# Patient Record
Sex: MALE | Race: WHITE | NOT HISPANIC OR LATINO | Employment: FULL TIME | URBAN - METROPOLITAN AREA
[De-identification: names, ages, dates, MRNs, and addresses within clinical notes are randomized per-mention and may not be internally consistent; named-entity substitution may affect disease eponyms.]

---

## 2017-09-11 ENCOUNTER — OFFICE VISIT (OUTPATIENT)
Dept: INTERNAL MEDICINE | Facility: CLINIC | Age: 57
End: 2017-09-11

## 2017-09-11 VITALS
WEIGHT: 234 LBS | SYSTOLIC BLOOD PRESSURE: 124 MMHG | BODY MASS INDEX: 33.5 KG/M2 | DIASTOLIC BLOOD PRESSURE: 64 MMHG | HEIGHT: 70 IN

## 2017-09-11 DIAGNOSIS — G47.09 OTHER INSOMNIA: ICD-10-CM

## 2017-09-11 DIAGNOSIS — Z86.718 HISTORY OF DVT (DEEP VEIN THROMBOSIS): ICD-10-CM

## 2017-09-11 DIAGNOSIS — Z82.49 FAMILY HISTORY OF HEART DISEASE: ICD-10-CM

## 2017-09-11 DIAGNOSIS — Z00.00 ANNUAL PHYSICAL EXAM: Primary | ICD-10-CM

## 2017-09-11 DIAGNOSIS — G24.9 DYSTONIA: ICD-10-CM

## 2017-09-11 DIAGNOSIS — Z79.01 CHRONIC ANTICOAGULATION: ICD-10-CM

## 2017-09-11 DIAGNOSIS — Z86.711 HISTORY OF PULMONARY EMBOLISM: ICD-10-CM

## 2017-09-11 LAB
ALBUMIN SERPL-MCNC: 4.4 G/DL (ref 3.5–5.2)
ALBUMIN/GLOB SERPL: 1.2 G/DL
ALP SERPL-CCNC: 55 U/L (ref 39–117)
ALT SERPL W P-5'-P-CCNC: 27 U/L (ref 1–41)
ANION GAP SERPL CALCULATED.3IONS-SCNC: 13.1 MMOL/L
AST SERPL-CCNC: 25 U/L (ref 1–40)
BASOPHILS # BLD AUTO: 0.02 10*3/MM3 (ref 0–0.2)
BASOPHILS NFR BLD AUTO: 0.3 % (ref 0–2)
BILIRUB SERPL-MCNC: 0.5 MG/DL (ref 0.1–1.2)
BILIRUB UR QL STRIP: NEGATIVE
BUN BLD-MCNC: 21 MG/DL (ref 6–20)
BUN/CREAT SERPL: 16 (ref 7–25)
CALCIUM SPEC-SCNC: 10 MG/DL (ref 8.6–10.5)
CHLORIDE SERPL-SCNC: 96 MMOL/L (ref 98–107)
CHOLEST SERPL-MCNC: 172 MG/DL (ref 0–200)
CLARITY UR: CLEAR
CO2 SERPL-SCNC: 26.9 MMOL/L (ref 22–29)
COLOR UR: YELLOW
CREAT BLD-MCNC: 1.31 MG/DL (ref 0.76–1.27)
DEPRECATED RDW RBC AUTO: 41.2 FL (ref 37–54)
EOSINOPHIL # BLD AUTO: 0.05 10*3/MM3 (ref 0–0.7)
EOSINOPHIL NFR BLD AUTO: 0.8 % (ref 0–5)
ERYTHROCYTE [DISTWIDTH] IN BLOOD BY AUTOMATED COUNT: 12.6 % (ref 11.5–15)
GFR SERPL CREATININE-BSD FRML MDRD: 56 ML/MIN/1.73
GLOBULIN UR ELPH-MCNC: 3.6 GM/DL
GLUCOSE BLD-MCNC: 100 MG/DL (ref 65–99)
GLUCOSE UR STRIP-MCNC: NEGATIVE MG/DL
HCT VFR BLD AUTO: 52.1 % (ref 40.1–51)
HDLC SERPL-MCNC: 46 MG/DL (ref 40–60)
HGB BLD-MCNC: 17.9 G/DL (ref 13.7–17.5)
HGB UR QL STRIP.AUTO: NEGATIVE
INR PPP: 2.8
KETONES UR QL STRIP: NEGATIVE
LDLC SERPL CALC-MCNC: 104 MG/DL (ref 0–100)
LDLC/HDLC SERPL: 2.26 {RATIO}
LEUKOCYTE ESTERASE UR QL STRIP.AUTO: NEGATIVE
LYMPHOCYTES # BLD AUTO: 1.43 10*3/MM3 (ref 0.8–7)
LYMPHOCYTES NFR BLD AUTO: 21.5 % (ref 10–60)
MCH RBC QN AUTO: 30.7 PG (ref 26–34)
MCHC RBC AUTO-ENTMCNC: 34.4 G/DL (ref 31–37)
MCV RBC AUTO: 89.4 FL (ref 80–100)
MONOCYTES # BLD AUTO: 0.65 10*3/MM3 (ref 0–1)
MONOCYTES NFR BLD AUTO: 9.8 % (ref 0–13)
NEUTROPHILS # BLD AUTO: 4.5 10*3/MM3 (ref 1–11)
NEUTROPHILS NFR BLD AUTO: 67.6 % (ref 30–85)
NITRITE UR QL STRIP: NEGATIVE
PH UR STRIP.AUTO: 7 [PH] (ref 5–8)
PLATELET # BLD AUTO: 192 10*3/MM3 (ref 150–450)
PMV BLD AUTO: 10.3 FL (ref 6–12)
POTASSIUM BLD-SCNC: 3.8 MMOL/L (ref 3.5–5.2)
PROT SERPL-MCNC: 8 G/DL (ref 6–8.5)
PROT UR QL STRIP: NEGATIVE
RBC # BLD AUTO: 5.83 10*6/MM3 (ref 4.63–6.08)
SODIUM BLD-SCNC: 136 MMOL/L (ref 136–145)
SP GR UR STRIP: 1.01 (ref 1–1.03)
TRIGL SERPL-MCNC: 111 MG/DL (ref 0–150)
TSH SERPL DL<=0.05 MIU/L-ACNC: 1.85 MIU/ML (ref 0.27–4.2)
UROBILINOGEN UR QL STRIP: NORMAL
VLDLC SERPL-MCNC: 22.2 MG/DL (ref 5–40)
WBC NRBC COR # BLD: 6.65 10*3/MM3 (ref 5–10)

## 2017-09-11 PROCEDURE — 85610 PROTHROMBIN TIME: CPT | Performed by: INTERNAL MEDICINE

## 2017-09-11 PROCEDURE — 80053 COMPREHEN METABOLIC PANEL: CPT | Performed by: INTERNAL MEDICINE

## 2017-09-11 PROCEDURE — 84443 ASSAY THYROID STIM HORMONE: CPT | Performed by: INTERNAL MEDICINE

## 2017-09-11 PROCEDURE — 36416 COLLJ CAPILLARY BLOOD SPEC: CPT | Performed by: INTERNAL MEDICINE

## 2017-09-11 PROCEDURE — 85025 COMPLETE CBC W/AUTO DIFF WBC: CPT | Performed by: INTERNAL MEDICINE

## 2017-09-11 PROCEDURE — 36415 COLL VENOUS BLD VENIPUNCTURE: CPT | Performed by: INTERNAL MEDICINE

## 2017-09-11 PROCEDURE — 81003 URINALYSIS AUTO W/O SCOPE: CPT | Performed by: INTERNAL MEDICINE

## 2017-09-11 PROCEDURE — 99386 PREV VISIT NEW AGE 40-64: CPT | Performed by: INTERNAL MEDICINE

## 2017-09-11 PROCEDURE — 80061 LIPID PANEL: CPT | Performed by: INTERNAL MEDICINE

## 2017-09-11 RX ORDER — FOLIC ACID 1 MG/1
1 TABLET ORAL
COMMUNITY
Start: 2017-08-18 | End: 2017-11-16

## 2017-09-11 RX ORDER — LEVOCETIRIZINE DIHYDROCHLORIDE 5 MG/1
5 TABLET, FILM COATED ORAL
COMMUNITY

## 2017-09-11 RX ORDER — NAPROXEN SODIUM 220 MG
220 TABLET ORAL
COMMUNITY
End: 2020-08-17

## 2017-09-11 RX ORDER — HYDROCHLOROTHIAZIDE 25 MG/1
25 TABLET ORAL
COMMUNITY

## 2017-09-11 RX ORDER — OMEGA-3-ACID ETHYL ESTERS 1 G/1
2 CAPSULE, LIQUID FILLED ORAL
COMMUNITY
Start: 2017-08-18

## 2017-09-11 RX ORDER — WARFARIN SODIUM 5 MG/1
7.5 TABLET ORAL
COMMUNITY
Start: 2017-08-18 | End: 2017-11-16

## 2017-09-11 RX ORDER — BUTALBITAL, ASPIRIN, AND CAFFEINE 50; 325; 40 MG/1; MG/1; MG/1
1 CAPSULE ORAL
COMMUNITY

## 2017-09-11 RX ORDER — TESTOSTERONE GEL, 1% 10 MG/G
50 GEL TRANSDERMAL
COMMUNITY

## 2017-09-11 RX ORDER — TIZANIDINE 4 MG/1
4 TABLET ORAL
COMMUNITY
Start: 2017-08-18 | End: 2017-11-16

## 2017-09-11 RX ORDER — TRAZODONE HYDROCHLORIDE 150 MG/1
150 TABLET ORAL
COMMUNITY
Start: 2017-07-17 | End: 2017-10-15

## 2017-09-11 NOTE — PROGRESS NOTES
Chief Complaint   Patient presents with   • new patient     new patient get est   • Rash     rash on both legs, has had blood clots in past and concerned        Subjective   Kris Michelle is a 57 y.o. male     HPI:  He is a new patient here to establish care, have general exam.  He has several medical problems.    History of DVT and pulmonary embolus: This occurred after playing travel in 2009.  He had an extensive hematology evaluation.  He states he was evaluated for hypercoagulable disorders and was also evaluated for occult malignancy.  Evaluation was negative.  After treatment of DVT and pulmonary embolus, discontinuation of warfarin was tried.  However, he had recurrent DVT and has been on warfarin ever since.  He states that his pro times have been good.  Usually in the mid 2 range.  He has recently had a rash on both lower legs.  He thought it might.  Later to recurrent DVT.  He has had a venous Doppler which was negative.  There is been no change in clothing, specifically socks.  He does not soak his feet in any way.  He does not have rash on other parts of his body.  The rash does not itch.  There has been no change in personal care products.    History of muscle spasms, dystonia.  He has history of muscles in his arms and legs going into sustained spasm.  He was evaluated by neurology and diagnosed with dystonia.  This is been treated with a muscle relaxer.  His symptoms are controlled with this.    He has problems with insomnia.  He takes trazodone for this.    There is a family history of heart disease.  He apparently also has dyslipidemia.  He takes Lovaza for this.    He has history of kidney stones.  He had at least one episode of a kidney stone.  The stone was evaluated by urology.  He has been taking hydrochlorothiazide to prevent recurrent stones.  He also has low testosterone and follows up with urology for that.    He has history of narrow angle glaucoma.  He has regular ophthalmology  "examinations.        Rash on both legs, since beginning of the year.  Went to see dermatologist. Had ul done to look for clots, it thee all the [place], slightly raised. Does not itch,.  Believes he tried a steroid cream.      The following portions of the patient's history were reviewed and updated as appropriate: allergies, current medications, past social history, problem list, past surgical history    Review of Systems   Constitutional: Negative for appetite change, chills, fatigue and fever.   HENT: Negative for congestion, ear pain, sinus pressure, sneezing, sore throat, tinnitus, trouble swallowing and voice change.    Eyes: Negative for visual disturbance.        He wears glasses.  He has history of glaucoma.   Respiratory: Negative for cough and shortness of breath.    Cardiovascular: Positive for leg swelling. Negative for chest pain and palpitations.        He had significant swelling of his leg when he had the DVT.   Gastrointestinal: Negative for abdominal pain, constipation, diarrhea, nausea and vomiting.   Endocrine: Negative for cold intolerance, heat intolerance, polydipsia and polyuria.   Genitourinary: Negative for dysuria and frequency.   Musculoskeletal: Positive for myalgias (with episodes of dystonia). Negative for arthralgias, back pain and gait problem.   Skin: Positive for rash.   Neurological: Negative for dizziness, syncope and headaches.   Hematological: Negative for adenopathy. Does not bruise/bleed easily.   Psychiatric/Behavioral: Negative for decreased concentration, dysphoric mood and sleep disturbance. The patient is not nervous/anxious.            Objective     /64  Ht 70\" (177.8 cm)  Wt 234 lb (106 kg)  BMI 33.58 kg/m2     Physical Exam   Constitutional: He is oriented to person, place, and time. He appears well-developed and well-nourished. No distress.   HENT:   Right Ear: Hearing, tympanic membrane, external ear and ear canal normal.   Left Ear: Hearing, tympanic " membrane, external ear and ear canal normal.   Nose: Right sinus exhibits no maxillary sinus tenderness and no frontal sinus tenderness. Left sinus exhibits no maxillary sinus tenderness and no frontal sinus tenderness.   Eyes: Conjunctivae, EOM and lids are normal. Pupils are equal, round, and reactive to light.   Neck: Trachea normal and phonation normal. Neck supple. Normal carotid pulses and no JVD present. Carotid bruit is not present. No thyroid mass and no thyromegaly present.   Cardiovascular: Normal rate, regular rhythm, S1 normal and S2 normal.  PMI is not displaced.  Exam reveals no gallop and no friction rub.    No murmur heard.  Pulses:       Carotid pulses are 2+ on the right side, and 2+ on the left side.       Radial pulses are 2+ on the right side, and 2+ on the left side.        Dorsalis pedis pulses are 2+ on the right side, and 2+ on the left side.   Pulmonary/Chest: Effort normal and breath sounds normal. He has no wheezes. He has no rhonchi. He has no rales. Chest wall is not dull to percussion.   Abdominal: Soft. Normal appearance, normal aorta and bowel sounds are normal. He exhibits no abdominal bruit and no mass. There is no hepatosplenomegaly. There is no tenderness.   Musculoskeletal: Normal range of motion. He exhibits no edema or tenderness.   Lymphadenopathy:     He has no cervical adenopathy.     He has no axillary adenopathy.        Left: No supraclavicular adenopathy present.   Neurological: He is alert and oriented to person, place, and time. He has normal strength and normal reflexes. No cranial nerve deficit or sensory deficit. Coordination normal.   Skin: Skin is warm and dry. No rash noted.   Psychiatric: He has a normal mood and affect. His speech is normal and behavior is normal. Judgment and thought content normal. Cognition and memory are normal. He is attentive.   Nursing note and vitals reviewed.      Assessment/Plan   Problem List Items Addressed This Visit     None       Visit Diagnoses     Annual physical exam    -  Primary    Relevant Orders    Comprehensive Metabolic Panel    CBC & Differential    Urinalysis With / Microscopic If Indicated    Lipid Panel    TSH    Chronic anticoagulation        Relevant Orders    POC INR    History of DVT (deep vein thrombosis)        History of pulmonary embolism        Dystonia        Relevant Medications    tiZANidine (ZANAFLEX) 4 MG tablet    Family history of heart disease        Other insomnia            Encouraged him to continue prudent diet, regular exercise.  He had a colonoscopy this year in July.  He had a Tdap in 2014.  He has regular dental and eye exams.  We discussed the rash.  Recommended she see a dermatologist.

## 2023-04-10 ENCOUNTER — OFFICE VISIT (OUTPATIENT)
Dept: SURGERY | Facility: CLINIC | Age: 63
End: 2023-04-10
Payer: COMMERCIAL

## 2023-04-10 VITALS — BODY MASS INDEX: 34.65 KG/M2 | WEIGHT: 242 LBS | HEIGHT: 70 IN

## 2023-04-10 DIAGNOSIS — K21.9 GASTROESOPHAGEAL REFLUX DISEASE WITHOUT ESOPHAGITIS: Primary | ICD-10-CM

## 2023-04-10 DIAGNOSIS — Z80.0 FAMILY HISTORY OF COLON CANCER: ICD-10-CM

## 2023-04-10 DIAGNOSIS — Z12.11 SCREENING FOR MALIGNANT NEOPLASM OF COLON: ICD-10-CM

## 2023-04-10 PROCEDURE — 99203 OFFICE O/P NEW LOW 30 MIN: CPT

## 2023-04-10 RX ORDER — CHLORAL HYDRATE 500 MG
2 CAPSULE ORAL 2 TIMES DAILY
COMMUNITY
End: 2023-04-10 | Stop reason: ALTCHOICE

## 2023-04-10 RX ORDER — MULTIPLE VITAMINS W/ MINERALS TAB 9MG-400MCG
1 TAB ORAL DAILY
COMMUNITY

## 2023-04-10 RX ORDER — TIZANIDINE 4 MG/1
4 TABLET ORAL AS NEEDED
COMMUNITY
Start: 2023-03-20

## 2023-04-10 RX ORDER — OMEPRAZOLE 20 MG/1
20 CAPSULE, DELAYED RELEASE ORAL DAILY
Qty: 30 CAPSULE | Refills: 0 | Status: SHIPPED | OUTPATIENT
Start: 2023-04-10

## 2023-04-10 RX ORDER — FAMOTIDINE 20 MG/1
20 TABLET, FILM COATED ORAL DAILY
COMMUNITY

## 2023-04-10 RX ORDER — TADALAFIL 5 MG/1
5 TABLET ORAL AS NEEDED
COMMUNITY
Start: 2023-02-09

## 2023-04-10 NOTE — PROGRESS NOTES
ASSESSMENT/PLAN:  62-year-old male in need of a screening colonoscopy. He is due this year; his last colonoscopy was in 2018 at [facility].  He has a history of colon polyps.  Family history significant for colon cancer in his paternal great uncle.  Recommend he undergo a colonoscopy.    He recently had a CTA in December 2022 due to an episode of pleuritic chest pain; past medical history significant for PE/DVT.  Imaging report noted a small hiatal hernia. Dr. Reyez and I reviewed the images as well. On my review of images, there is no hiatal hernia appreciated.  He does complain of severe nocturnal regurgitation. Recommend he undergo an EGD for further evaluation.  I have also sent a prescription for Prilosec 20 mg once daily to his pharmacy. Instructed him to begin taking this.      Also, he complains of a possible external hemorrhoid. On external exam, there are no hemorrhoids noted, he does have redundant tissue which is likely what he is feeling.    Overall, Dr. Reyez and I recommended he undergo a colonoscopy and EGD. Discussed the nature of the procedure, benefits and risks. He verbalized understanding and wishes to proceed.  He will need to hold his Warfarin 5 days prior to the procedure.    CC:     Hiatal hernia, hemorrhoid    HPI:  62 year old male who presents today for evaluation of recently noted hiatal hernia. He states for the past month he has been waking up in the middle of the night gasping for air. During this time, he has severe reflux and vomits. He has taken Pepcid and has not seen any improvement in his symptoms at nighttime. He denies any heartburn or reflux throughout the day, this is predominantly at nighttime. Denies any trouble swallowing. Denies any abdominal pain, nausea, fevers or chills. Reports normal bowel movements. He does have a history of sleep apnea, states he is CPAP and BiPAP intolerant. He also complains of a possible external hemorrhoid and rectal pain. Denies any  "rectal bleeding. He also is due for a colonoscopy this year; his last one was in 2018.  He has a history of polyps. Family history is positive for colon cancer in his paternal great uncle.     ENDOSCOPY:   • Colonoscopy 2018 - [facility], 5 year surveillance    RADIOLOGY:   • 12/15/2022 CTA Chest: Small hiatal hernia, no sign of pulmonary embolism, no significant acute pulmonary abnormality (Upon my review of images, I do not appreciate a hiatal hernia)    LABS:    • 12/15/2022 creatinine 1.80, GFR 38    SOCIAL HISTORY:   • Denies tobacco use  • Occasional alcohol use    FAMILY HISTORY:    • Colorectal cancer: Paternal great uncle  • Pancreatic cancer: Maternal grandmother    PREVIOUS ABDOMINAL SURGERY:  • Ventral hernia repair with mesh - Dr. Montanez    OTHER SURGERY:  • Hand surgery  • Wart removal - throat    PAST MEDICAL HISTORY:    • GERD  • Cataract  • Presbyopia  • Meibomian gland dysfunction  • Open angle glaucoma  • Sleep apnea  • Dyslipidemia  • Erectile dysfunction  • Degenerative disc disease  • Anxiety  • History of PE  • History of DVT  • Nephrolithiasis  • Hypogonadism     ALLERGIES:   • None    MEDICATIONS:   • Famotidine  • Folic acid  • Hydrochlorothiazide  • Levocetirizine  • Multivitamin  • Omega-3  • Tadalafil  • Testosterone  • Tizanidine  • Travatan ophthalmic solution  • Trazodone  • Warfarin  • Omeprazole    ROS:    No cough, chest pain, shortness of air.  All other systems reviewed and negative other than presenting complaints.    PHYSICAL EXAM:   • Constitutional: Well-developed, well-nourished, no acute distress  • Vital signs:   o Ht: 177.8cm (70\")  o Wt: 110kg (242lb)  o BMI: 34.72  • Eyes: Conjunctiva normal, sclera nonicteric  • ENMT: Hearing grossly normal, oral mucosa moist  • Respiratory: Clear to auscultation, normal inspiratory effort  • Cardiovascular: Regular rate, no murmur, no peripheral edema, no jugular venous distention  • Gastrointestinal: Soft, nontender, no palpable mass, " no hepatosplenomegaly, on external rectal exam, no hemorrhoid noted, there is redundant tissue  • Skin: Warm, dry, no rash on visualized skin surfaces  • Musculoskeletal: Symmetric strength, normal gait  • Psychiatric: Alert and oriented ×3, normal affect     Jessi Porter PA-C  General Surgery     [facility]  [address], [address]  [place] [address]  P: [phone]  F: [phone]

## 2023-04-11 PROBLEM — Z12.11 SCREENING FOR MALIGNANT NEOPLASM OF COLON: Status: ACTIVE | Noted: 2023-04-11

## 2023-04-11 PROBLEM — K21.9 GASTROESOPHAGEAL REFLUX DISEASE WITHOUT ESOPHAGITIS: Status: ACTIVE | Noted: 2023-04-11

## 2023-04-11 PROBLEM — Z80.0 FAMILY HISTORY OF COLON CANCER: Status: ACTIVE | Noted: 2023-04-11

## 2023-04-12 ENCOUNTER — TELEPHONE (OUTPATIENT)
Dept: SURGERY | Facility: CLINIC | Age: 63
End: 2023-04-12
Payer: COMMERCIAL

## 2023-04-12 NOTE — TELEPHONE ENCOUNTER
----- Message from Jessi Porter PA-C sent at 4/11/2023 10:06 PM EDT -----  Regarding: EGD/cscope  Hey,    Can you make sure he is aware he will need to hold his Warfarin 5 days prior to his procedure. I believe we discussed this but did not write it down when he was scheduled. I am going to send a note to cardiology/INR clinic.     Thanks,  Jessi

## 2023-04-14 ENCOUNTER — PATIENT ROUNDING (BHMG ONLY) (OUTPATIENT)
Dept: SURGERY | Facility: CLINIC | Age: 63
End: 2023-04-14
Payer: COMMERCIAL

## 2023-09-06 ENCOUNTER — HOSPITAL ENCOUNTER (OUTPATIENT)
Facility: HOSPITAL | Age: 63
Setting detail: HOSPITAL OUTPATIENT SURGERY
Discharge: HOME | End: 2023-09-06
Attending: SURGERY | Admitting: SURGERY
Payer: COMMERCIAL

## 2023-09-06 ENCOUNTER — ANESTHESIA (OUTPATIENT)
Dept: GASTROENTEROLOGY | Facility: HOSPITAL | Age: 63
End: 2023-09-06
Payer: COMMERCIAL

## 2023-09-06 ENCOUNTER — ANESTHESIA EVENT (OUTPATIENT)
Dept: GASTROENTEROLOGY | Facility: HOSPITAL | Age: 63
End: 2023-09-06
Payer: COMMERCIAL

## 2023-09-06 VITALS
HEART RATE: 76 BPM | HEIGHT: 70 IN | WEIGHT: 237.1 LBS | BODY MASS INDEX: 33.94 KG/M2 | SYSTOLIC BLOOD PRESSURE: 117 MMHG | RESPIRATION RATE: 16 BRPM | OXYGEN SATURATION: 96 % | DIASTOLIC BLOOD PRESSURE: 77 MMHG

## 2023-09-06 PROCEDURE — 45378 DIAGNOSTIC COLONOSCOPY: CPT | Performed by: SURGERY

## 2023-09-06 PROCEDURE — 25010000002 PROPOFOL 10 MG/ML EMULSION: Performed by: ANESTHESIOLOGY

## 2023-09-06 PROCEDURE — 43235 EGD DIAGNOSTIC BRUSH WASH: CPT | Performed by: SURGERY

## 2023-09-06 RX ORDER — SODIUM CHLORIDE, SODIUM LACTATE, POTASSIUM CHLORIDE, CALCIUM CHLORIDE 600; 310; 30; 20 MG/100ML; MG/100ML; MG/100ML; MG/100ML
30 INJECTION, SOLUTION INTRAVENOUS CONTINUOUS PRN
Status: DISCONTINUED | OUTPATIENT
Start: 2023-09-06 | End: 2023-09-06 | Stop reason: HOSPADM

## 2023-09-06 RX ORDER — PROPOFOL 10 MG/ML
VIAL (ML) INTRAVENOUS CONTINUOUS PRN
Status: DISCONTINUED | OUTPATIENT
Start: 2023-09-06 | End: 2023-09-06 | Stop reason: SURG

## 2023-09-06 RX ORDER — PROPOFOL 10 MG/ML
VIAL (ML) INTRAVENOUS AS NEEDED
Status: DISCONTINUED | OUTPATIENT
Start: 2023-09-06 | End: 2023-09-06 | Stop reason: SURG

## 2023-09-06 RX ORDER — SODIUM CHLORIDE, SODIUM LACTATE, POTASSIUM CHLORIDE, CALCIUM CHLORIDE 600; 310; 30; 20 MG/100ML; MG/100ML; MG/100ML; MG/100ML
INJECTION, SOLUTION INTRAVENOUS CONTINUOUS PRN
Status: DISCONTINUED | OUTPATIENT
Start: 2023-09-06 | End: 2023-09-06 | Stop reason: SURG

## 2023-09-06 RX ORDER — LIDOCAINE HYDROCHLORIDE 20 MG/ML
INJECTION, SOLUTION INFILTRATION; PERINEURAL AS NEEDED
Status: DISCONTINUED | OUTPATIENT
Start: 2023-09-06 | End: 2023-09-06 | Stop reason: SURG

## 2023-09-06 RX ADMIN — SODIUM CHLORIDE, POTASSIUM CHLORIDE, SODIUM LACTATE AND CALCIUM CHLORIDE: 600; 310; 30; 20 INJECTION, SOLUTION INTRAVENOUS at 08:46

## 2023-09-06 RX ADMIN — PROPOFOL 100 MG: 10 INJECTION, EMULSION INTRAVENOUS at 08:46

## 2023-09-06 RX ADMIN — LIDOCAINE HYDROCHLORIDE 60 MG: 20 INJECTION, SOLUTION INFILTRATION; PERINEURAL at 08:46

## 2023-09-06 RX ADMIN — Medication 160 MCG/KG/MIN: at 08:46

## 2023-09-06 RX ADMIN — SODIUM CHLORIDE, POTASSIUM CHLORIDE, SODIUM LACTATE AND CALCIUM CHLORIDE 30 ML/HR: 600; 310; 30; 20 INJECTION, SOLUTION INTRAVENOUS at 07:56

## 2023-09-06 NOTE — ANESTHESIA PREPROCEDURE EVALUATION
Anesthesia Evaluation                  Airway   Dental      Pulmonary    (+) ,sleep apnea  Cardiovascular     (+) DVT      Neuro/Psych  (+) psychiatric history  GI/Hepatic/Renal/Endo    (+) hiatal hernia, GERD, renal disease    Musculoskeletal     Abdominal    Substance History      OB/GYN          Other   arthritis,                     Anesthesia Plan    ASA 2     MAC           CODE STATUS:

## 2023-09-06 NOTE — ANESTHESIA POSTPROCEDURE EVALUATION
"Patient: Kris Michelle    Procedure Summary       Date: 09/06/23 Room / [facility]:  [facility]    Anesthesia Start: 0841 Anesthesia Stop: 0902    Procedures:       ESOPHAGOGASTRODUODENOSCOPY (Esophagus)      COLONOSCOPY to CECUM AND TI Diagnosis:       Gastroesophageal reflux disease without esophagitis      Screening for malignant neoplasm of colon      Family history of colon cancer      (Gastroesophageal reflux disease without esophagitis [K21.9])      (Screening for malignant neoplasm of colon [Z12.11])      (Family history of colon cancer [Z80.0])    Surgeons: Salinas Reyez MD Provider: Quincy Macdonald MD    Anesthesia Type: MAC ASA Status: 2            Anesthesia Type: MAC    Vitals  Vitals Value Taken Time   BP     Temp     Pulse 78 09/06/23 0902   Resp     SpO2 97 % 09/06/23 0902   Vitals shown include unvalidated device data.        Post Anesthesia Care and Evaluation    Patient location during evaluation: bedside  Patient participation: complete - patient participated  Level of consciousness: awake and alert  Pain management: adequate    Airway patency: patent  Anesthetic complications: No anesthetic complications    Cardiovascular status: acceptable  Respiratory status: acceptable  Hydration status: acceptable    Comments: /79 (BP Location: Left arm, Patient Position: Lying)   Pulse 76   Resp 16   Ht 177.8 cm (70\")   Wt 108 kg (237 lb 1.6 oz)   SpO2 97%   BMI 34.02 kg/m²     "

## 2023-09-06 NOTE — OP NOTE
PREOPERATIVE DIAGNOSIS:  GERD  High rescreening secondary to personal history of polyps and family history of colon cancer    POSTOPERATIVE DIAGNOSIS AND FINDINGS:  Small sliding hiatal hernia  Normal colon    PROCEDURE:  EGD  Colonoscopy to terminal ileum    SURGEON:  Salinas Reyez MD    ANESTHESIA:  MAC    SPECIMEN(S):  none    DESCRIPTION:  In decubitus position endoscope was inserted into the esophagus, stomach and duodenum. Antegrade and retroflex view of stomach performed.  First and second portions of duodenum were grossly normal.  Gastric antrum, body, and retroflexed view of fundus were normal.  GE junction and esophagus were normal with exception of a small sliding hiatal hernia.    Digital rectal exam was normal. Colonoscope inserted under direct visualization of lumen to cecum confirmed by visualization of ileocecal valve and appendiceal orifice.  Ileocecal valve was intubated and terminal ileum was grossly normal.  Scope was slowly withdrawn circumferentially examining all mucosal surfaces.  Bowel preparation was good.  There were no mucosal abnormalities.  Tolerated well.    RECOMMENDATION FOR FUTURE SURVEILLANCE:  5 years    Salinas Reyez M.D.

## 2023-09-06 NOTE — H&P
CC: GERD and screening    HPI: [id number] gentleman in need for surveillance colonoscopy.  He does have a family history of colon cancer in his paternal great uncle as well as a personal history of polyps.  He also reports severe nocturnal regurgitation and was advised to undergo EGD as well.  We gave her a prescription for Prilosec he indicates today that this has substantially helped with his symptoms.    PMH, PSH, MEDS AND ALLERGIES reviewed and reconciled in  EPIC    PHYSICAL EXAM:  Constitutional:  awake, alert, no acute distress  VS: afebrile, VSS  Respiratory:  normal inspiratory effort  Cardiovascular: regular rate  Gastrointestinal: Soft    ROS:  relevant systems negative other than any presenting complaints    ASSESSMENT:    GERD  Family history of colon cancer-paternal uncle  Personal history of polyps    PLAN:  EGD and colonoscopy    Salinas Reyez M.D.

## 2023-10-13 ENCOUNTER — OFFICE VISIT (OUTPATIENT)
Dept: CARDIOLOGY | Facility: CLINIC | Age: 63
End: 2023-10-13
Payer: COMMERCIAL

## 2023-10-13 VITALS
BODY MASS INDEX: 34.24 KG/M2 | HEIGHT: 70 IN | DIASTOLIC BLOOD PRESSURE: 80 MMHG | WEIGHT: 239.2 LBS | SYSTOLIC BLOOD PRESSURE: 138 MMHG | HEART RATE: 73 BPM

## 2023-10-13 DIAGNOSIS — I82.5Z9 CHRONIC DEEP VEIN THROMBOSIS (DVT) OF DISTAL VEIN OF LOWER EXTREMITY, UNSPECIFIED LATERALITY: ICD-10-CM

## 2023-10-13 DIAGNOSIS — I26.99 PULMONARY EMBOLISM, OTHER, UNSPECIFIED CHRONICITY, UNSPECIFIED WHETHER ACUTE COR PULMONALE PRESENT: ICD-10-CM

## 2023-10-13 DIAGNOSIS — I25.10 CORONARY ARTERY DISEASE INVOLVING NATIVE CORONARY ARTERY OF NATIVE HEART WITHOUT ANGINA PECTORIS: Primary | ICD-10-CM

## 2023-10-13 RX ORDER — MONTELUKAST SODIUM 10 MG/1
10 TABLET ORAL DAILY
COMMUNITY
Start: 2023-08-02

## 2023-10-13 RX ORDER — AZELASTINE 1 MG/ML
1 SPRAY, METERED NASAL
COMMUNITY
Start: 2023-08-02

## 2023-10-13 RX ORDER — ROSUVASTATIN CALCIUM 10 MG/1
10 TABLET, COATED ORAL DAILY
COMMUNITY
Start: 2023-07-27 | End: 2024-07-26

## 2023-10-13 RX ORDER — IPRATROPIUM BROMIDE AND ALBUTEROL SULFATE 2.5; .5 MG/3ML; MG/3ML
3 SOLUTION RESPIRATORY (INHALATION)
COMMUNITY
Start: 2023-09-28

## 2023-10-13 NOTE — PROGRESS NOTES
Date of Office Visit: 10/13/23  Encounter Provider: Quincy Salazar MD  Place of Service: [facility]  Patient Name: Kris Michelle  :1960  [id number]    Chief Complaint   Patient presents with    referral from PCP      CT scan showed Atherosclerosis        HPI: Kris Michelle is a 63 y.o. male  He comes to see me as a new patient.  He was at [facility] with pneumonia this summer and had a CT of his chest and it showed some calcification of his coronaries.  He is coming to see us to decide what to do about that.  He has never had cardiac problems there is a family history of it he does not have hypertension he is always says his blood pressures been good he is on HCTZ because he has had kidney stones and we are treating him for that he is on Lovaza because his wife used to sell it and she thought he should be on it not really because he has high triglycerides over and his lipids have been okay he has never been diagnosed with diabetes but he had a hemoglobin A1c of 6.3.  He exercises regularly and rigorously without limitation no chest pain no shortness of breath no palpitations no syncope.    He had a history of DVT and PE after a flight to [place] in  came off of warfarin and he developed recurrent DVT so has been on warfarin since its been pretty easily managed but then he had to go to generic and has had a lot of difficulty managing his INR since then    Past Medical History:   Diagnosis Date    Age-related cataract of both eyes     Anatomical narrow angle, bilateral     Anxiety 01/15/2016    Bilateral kidney stones     Chronic anticoagulation 2018    Colon polyp 2015    Disc disease, degenerative, lumbar or lumbosacral 2018    DVT (deep venous thrombosis) 2014    Dyslipidemia 2018    Dystonia 2020    Hiatal hernia     History of pulmonary embolus (PE) 2014    Hx of blood clots     Hx of chronic fatigue syndrome     Hx of  gastroesophageal reflux (GERD)     Hypermetropia, bilateral     Hypogonadism in male     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    COLONOSCOPY N/A 2023    Procedure: COLONOSCOPY to CECUM AND TI;  Surgeon: Salinas Reyez MD;  Location:  [facility];  Service: General;  Laterality: N/A;  NORMAL    ENDOSCOPY N/A 2023    Procedure: ESOPHAGOGASTRODUODENOSCOPY;  Surgeon: Salinas Reyez MD;  Location:  [facility];  Service: General;  Laterality: N/A;  SLIDING HIATAL HERNIA    HAND SURGERY      VENTRAL HERNIA REPAIR N/A 2006    w/ mesh-Dr. Kris CHENG REMOVAL      tongue       Social History     Socioeconomic History    Marital status:    Tobacco Use    Smoking status: Never    Smokeless tobacco: Never   Vaping Use    Vaping Use: Never used   Substance and Sexual Activity    Alcohol use: Yes     Comment: occasional    Drug use: No    Sexual activity: Yes     Comment:        Family History   Problem Relation Age of Onset    Liver disease Mother     Crohn's disease Mother     Prostate cancer Father     Glaucoma Father     Pancreatic cancer Maternal Grandmother     Lung cancer Maternal Grandfather     Heart disease Maternal Grandfather     Heart disease Paternal Grandmother     Heart disease Paternal Grandfather     Prostate cancer Paternal Grandfather     Colon cancer Other        Review of Systems   Constitutional: Negative for decreased appetite, fever, malaise/fatigue and weight loss.   HENT:  Negative for nosebleeds.     Eyes:  Negative for double vision.   Cardiovascular:  Negative for chest pain, claudication, cyanosis, dyspnea on exertion, irregular heartbeat, leg swelling, near-syncope, orthopnea, palpitations, paroxysmal nocturnal dyspnea and syncope.   Respiratory:  Negative for cough, hemoptysis and shortness of breath.    Hematologic/Lymphatic: Negative for bleeding problem.   Skin:  Negative for rash.   Musculoskeletal:  Negative for falls and myalgias.   Gastrointestinal:  Negative for hematochezia, jaundice, melena, nausea and vomiting.   Genitourinary:  Negative for hematuria.   Neurological:  Negative for dizziness and seizures.   Psychiatric/Behavioral:  Negative for altered mental status and memory loss.        No Known Allergies      Current Outpatient Medications:     azelastine (ASTELIN) 0.1 % nasal spray, 1 spray into the nostril(s) as directed by provider., Disp: , Rfl:     famotidine (PEPCID) 20 MG tablet, Take 1 tablet by mouth Daily., Disp: , Rfl:     folic acid (FOLVITE) 1 MG tablet, Take 1 tablet by mouth Daily., Disp: , Rfl:     hydrochlorothiazide (HYDRODIURIL) 25 MG tablet, Take 1 tablet by mouth Daily., Disp: , Rfl:     ipratropium-albuterol (DUO-NEB) 0.5-2.5 mg/3 ml nebulizer, Inhale 3 mL., Disp: , Rfl:     levocetirizine (XYZAL) 5 MG tablet, Take 1 tablet by mouth Every Evening., Disp: , Rfl:     montelukast (SINGULAIR) 10 MG tablet, Take 1 tablet by mouth Daily., Disp: , Rfl:     multivitamin with minerals tablet tablet, Take 1 tablet by mouth Daily., Disp: , Rfl:     omeprazole (priLOSEC) 20 MG capsule, Take 1 capsule by mouth Daily., Disp: 30 capsule, Rfl: 0    rosuvastatin (CRESTOR) 10 MG tablet, Take 1 tablet by mouth Daily., Disp: , Rfl:     tadalafil (CIALIS) 5 MG tablet, Take 1 tablet by mouth As Needed., Disp: , Rfl:     testosterone (ANDROGEL) 50 MG/5GM (1%) gel gel, Place 50 mg on the skin as directed by provider., Disp: , Rfl:     tiZANidine (ZANAFLEX) 4 MG tablet, Take 1 tablet by mouth  "As Needed., Disp: , Rfl:     travoprost, benzalkonium, (TRAVATAN) 0.004 % ophthalmic solution, Administer 1 drop to both eyes Every Night., Disp: , Rfl:     traZODone (DESYREL) 150 MG tablet, Take 1 tablet by mouth Every Night., Disp: , Rfl:       Objective:     Vitals:    10/13/23 1542   BP: 138/80   Pulse: 73   Weight: 109 kg (239 lb 3.2 oz)   Height: 177.8 cm (70\")     Body mass index is 34.32 kg/mý.    Constitutional:       Appearance: Well-developed.   Eyes:      General: No scleral icterus.  HENT:      Head: Normocephalic.   Neck:      Thyroid: No thyromegaly.      Vascular: No JVD.      Lymphadenopathy: No cervical adenopathy.   Pulmonary:      Effort: Pulmonary effort is normal.      Breath sounds: Normal breath sounds. No wheezing. No rales.   Cardiovascular:      Normal rate. Regular rhythm.      No gallop.    Edema:     Peripheral edema absent.   Abdominal:      Palpations: Abdomen is soft.      Tenderness: There is no abdominal tenderness.   Musculoskeletal: Normal range of motion. Skin:     General: Skin is warm and dry.      Findings: No rash.   Neurological:      Mental Status: Alert and oriented to person, place, and time.           ECG 12 Lead    Date/Time: 10/13/2023 4:38 PM  Performed by: Quincy Salazar MD    Authorized by: Quincy Salazar MD  Comparison: compared with previous ECG   Similar to previous ECG  Rhythm: sinus rhythm    Clinical impression: normal ECG           Assessment:       Diagnosis Plan   1. Coronary artery disease involving native coronary artery of native heart without angina pectoris        2. Chronic deep vein thrombosis (DVT) of distal vein of lower extremity, unspecified laterality        3. Pulmonary embolism, other, unspecified chronicity, unspecified whether acute cor pulmonale present               Plan:       Well I think he is asymptomatic functioning at a high level so I do not think he has any obstructive coronary disease I would like to get a copy of the CT to " look and see how much calcium you are dealing with and I will see if I can get that from [facility].  You know it is all about risk modification I love the statin for him I would probably try and drive his LDL down around 70 I we can get there with 10 mg of rosuvastatin, I dont know.  I have encouraged him to try and get his weight down ad delmy. to see that A1c come under 6.  I have also told him I do not see a role for Lovaza he does not really meet the criteria plus is a week blood thinning effect with it so would like to stop that we talked about his warfarin and with him having difficulty with it is not really happy about the hassle of taking it I think we have to switch him to low-dose Eliquis that he will stay on chronically at 2.5 twice a day there is probably an arterial benefit from that and were not can to take an aspirin because of that I will have him come back and see me in a year sooner if he has trouble    Return in about 1 year (around 10/13/2024).     As always, it has been a pleasure to participate in your patient's care.      Sincerely,       Quincy Salazar MD

## 2025-03-24 ENCOUNTER — TELEPHONE (OUTPATIENT)
Dept: CARDIOLOGY | Age: 65
End: 2025-03-24

## 2025-03-24 NOTE — TELEPHONE ENCOUNTER
Caller: Kris Michelle    Relationship: Self    Best call back number: [phone]      [facility] PCP CALLED TO SCHEDULE PATIENT    SOB, FATIGUE, TACHYCARDIA  FOR AN APPT ASAP

## 2025-03-26 ENCOUNTER — OFFICE VISIT (OUTPATIENT)
Dept: CARDIOLOGY | Age: 65
End: 2025-03-26
Payer: COMMERCIAL

## 2025-03-26 ENCOUNTER — HOSPITAL ENCOUNTER (OUTPATIENT)
Dept: CARDIOLOGY | Facility: HOSPITAL | Age: 65
Discharge: HOME | End: 2025-03-26
Admitting: NURSE PRACTITIONER
Payer: COMMERCIAL

## 2025-03-26 VITALS
HEART RATE: 91 BPM | DIASTOLIC BLOOD PRESSURE: 74 MMHG | HEIGHT: 70 IN | BODY MASS INDEX: 35.65 KG/M2 | WEIGHT: 249 LBS | SYSTOLIC BLOOD PRESSURE: 126 MMHG

## 2025-03-26 DIAGNOSIS — I25.10 CORONARY ARTERY DISEASE INVOLVING NATIVE CORONARY ARTERY OF NATIVE HEART WITHOUT ANGINA PECTORIS: ICD-10-CM

## 2025-03-26 DIAGNOSIS — E78.5 DYSLIPIDEMIA: ICD-10-CM

## 2025-03-26 DIAGNOSIS — I10 ESSENTIAL HYPERTENSION: ICD-10-CM

## 2025-03-26 DIAGNOSIS — I25.10 CORONARY ARTERY DISEASE INVOLVING NATIVE CORONARY ARTERY OF NATIVE HEART WITHOUT ANGINA PECTORIS: Primary | ICD-10-CM

## 2025-03-26 DIAGNOSIS — R53.83 OTHER FATIGUE: ICD-10-CM

## 2025-03-26 DIAGNOSIS — I82.5Z9 CHRONIC DEEP VEIN THROMBOSIS (DVT) OF DISTAL VEIN OF LOWER EXTREMITY, UNSPECIFIED LATERALITY: ICD-10-CM

## 2025-03-26 DIAGNOSIS — Z86.711 HISTORY OF PULMONARY EMBOLUS (PE): ICD-10-CM

## 2025-03-26 LAB
ALBUMIN SERPL-MCNC: 4.2 G/DL (ref 3.5–5.2)
ALBUMIN/GLOB SERPL: 1.4 G/DL
ALP SERPL-CCNC: 67 U/L (ref 39–117)
ALT SERPL W P-5'-P-CCNC: 30 U/L (ref 1–41)
ANION GAP SERPL CALCULATED.3IONS-SCNC: 10 MMOL/L (ref 5–15)
AST SERPL-CCNC: 29 U/L (ref 1–40)
BH CV STRESS BP STAGE 1: NORMAL
BH CV STRESS BP STAGE 2: NORMAL
BH CV STRESS BP STAGE 3: NORMAL
BH CV STRESS DURATION MIN STAGE 1: 3
BH CV STRESS DURATION MIN STAGE 2: 3
BH CV STRESS DURATION MIN STAGE 3: 3
BH CV STRESS DURATION SEC STAGE 1: 0
BH CV STRESS DURATION SEC STAGE 2: 0
BH CV STRESS DURATION SEC STAGE 3: 0
BH CV STRESS GRADE STAGE 1: 10
BH CV STRESS GRADE STAGE 2: 12
BH CV STRESS GRADE STAGE 3: 14
BH CV STRESS HR STAGE 1: 121
BH CV STRESS HR STAGE 2: 141
BH CV STRESS HR STAGE 3: 156
BH CV STRESS METS STAGE 1: 5
BH CV STRESS METS STAGE 2: 7.5
BH CV STRESS METS STAGE 3: 10
BH CV STRESS PROTOCOL 1: NORMAL
BH CV STRESS RECOVERY BP: NORMAL MMHG
BH CV STRESS RECOVERY HR: 101 BPM
BH CV STRESS SPEED STAGE 1: 1.7
BH CV STRESS SPEED STAGE 2: 2.5
BH CV STRESS SPEED STAGE 3: 3.4
BH CV STRESS STAGE 1: 1
BH CV STRESS STAGE 2: 2
BH CV STRESS STAGE 3: 3
BILIRUB SERPL-MCNC: 0.5 MG/DL (ref 0–1.2)
BUN SERPL-MCNC: 18 MG/DL (ref 8–23)
BUN/CREAT SERPL: 13.7 (ref 7–25)
CALCIUM SPEC-SCNC: 9.7 MG/DL (ref 8.6–10.5)
CHLORIDE SERPL-SCNC: 101 MMOL/L (ref 98–107)
CO2 SERPL-SCNC: 26 MMOL/L (ref 22–29)
CREAT SERPL-MCNC: 1.31 MG/DL (ref 0.76–1.27)
DEPRECATED RDW RBC AUTO: 40.6 FL (ref 37–54)
EGFRCR SERPLBLD CKD-EPI 2021: 60.8 ML/MIN/1.73
ERYTHROCYTE [DISTWIDTH] IN BLOOD BY AUTOMATED COUNT: 15.4 % (ref 12.3–15.4)
GLOBULIN UR ELPH-MCNC: 3.1 GM/DL
GLUCOSE SERPL-MCNC: 104 MG/DL (ref 65–99)
HCT VFR BLD AUTO: 43.9 % (ref 37.5–51)
HGB BLD-MCNC: 13.5 G/DL (ref 13–17.7)
MAXIMAL PREDICTED HEART RATE: 156 BPM
MCH RBC QN AUTO: 23 PG (ref 26.6–33)
MCHC RBC AUTO-ENTMCNC: 30.8 G/DL (ref 31.5–35.7)
MCV RBC AUTO: 74.7 FL (ref 79–97)
PERCENT MAX PREDICTED HR: 100 %
PLATELET # BLD AUTO: 276 10*3/MM3 (ref 140–450)
PMV BLD AUTO: 10.2 FL (ref 6–12)
POTASSIUM SERPL-SCNC: 4 MMOL/L (ref 3.5–5.2)
PROT SERPL-MCNC: 7.3 G/DL (ref 6–8.5)
RBC # BLD AUTO: 5.88 10*6/MM3 (ref 4.14–5.8)
SODIUM SERPL-SCNC: 137 MMOL/L (ref 136–145)
STRESS BASELINE BP: NORMAL MMHG
STRESS BASELINE HR: 86 BPM
STRESS PERCENT HR: 118 %
STRESS POST ESTIMATED WORKLOAD: 10 METS
STRESS POST EXERCISE DUR MIN: 9 MIN
STRESS POST EXERCISE DUR SEC: 0 SEC
STRESS POST PEAK BP: NORMAL MMHG
STRESS POST PEAK HR: 156 BPM
STRESS TARGET HR: 133 BPM
TSH SERPL DL<=0.05 MIU/L-ACNC: 1.51 UIU/ML (ref 0.27–4.2)
WBC NRBC COR # BLD AUTO: 8.59 10*3/MM3 (ref 3.4–10.8)

## 2025-03-26 PROCEDURE — 85027 COMPLETE CBC AUTOMATED: CPT | Performed by: NURSE PRACTITIONER

## 2025-03-26 PROCEDURE — 93017 CV STRESS TEST TRACING ONLY: CPT

## 2025-03-26 PROCEDURE — 84443 ASSAY THYROID STIM HORMONE: CPT | Performed by: NURSE PRACTITIONER

## 2025-03-26 PROCEDURE — 93018 CV STRESS TEST I&R ONLY: CPT | Performed by: INTERNAL MEDICINE

## 2025-03-26 PROCEDURE — 36415 COLL VENOUS BLD VENIPUNCTURE: CPT

## 2025-03-26 PROCEDURE — 80053 COMPREHEN METABOLIC PANEL: CPT | Performed by: NURSE PRACTITIONER

## 2025-03-26 PROCEDURE — 93000 ELECTROCARDIOGRAM COMPLETE: CPT | Performed by: NURSE PRACTITIONER

## 2025-03-26 PROCEDURE — 93016 CV STRESS TEST SUPVJ ONLY: CPT | Performed by: INTERNAL MEDICINE

## 2025-03-26 RX ORDER — ALPRAZOLAM 0.5 MG
.25-.5 TABLET ORAL NIGHTLY PRN
COMMUNITY
Start: 2025-03-18 | End: 2025-03-28

## 2025-03-26 RX ORDER — LOSARTAN POTASSIUM 25 MG/1
25 TABLET ORAL DAILY
Qty: 30 TABLET | Refills: 2 | Status: SHIPPED | OUTPATIENT
Start: 2025-03-26

## 2025-03-26 NOTE — PROGRESS NOTES
Date of Office Visit: 2025  Encounter Provider: ELLE Alvarado  Place of Service: [facility]  Patient Name: Kris Michelle  :1960    Chief Complaint   Patient presents with    Coronary Artery Disease   :     HPI: Kris Michelle is a 64 y.o. male patient who was first seen in the office by Dr. Salazar in 2023 following a CT of his chest demonstrating coronary calcification.  He was asymptomatic, and Dr. Salazar recommended focusing on risk factor modification.  He also has a history of DVT and PE for which he was previously on warfarin.  Dr. Salazar recommended switching him to low-dose Eliquis.  As such, the aspirin was discontinued.  He was advised to follow-up in 1 year.    Essentially since , he has been profoundly fatigued.  Activities like showering or even difficult for him.  Although he does exercise pretty regularly without much limitation.  He is also been struggling with anxiety and insomnia.  One night last week he had what he suspects is a panic attack.  His heart rate was mildly elevated and he felt mildly short of breath.  He denies any chest pain, palpitations, edema, dizziness, or syncope.    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     and CY, poor responder to SSRI, SNRI, TCAs, opioids    Low testosterone     Male erectile  dysfunction, unspecified 2018    Meibomian gland dysfunction (MGD) of both eyes 2021    Myalgia 04/14/2018    Nephrolithiasis 09/09/2016    Obstructive sleep apnea 2018    Presbyopia 2021    Primary open-angle glaucoma, bilateral, mild stage 2021       Past Surgical History:   Procedure Laterality Date    COLONOSCOPY N/A 2018    [facility]    COLONOSCOPY N/A 9/6/2023    Procedure: COLONOSCOPY to CECUM AND TI;  Surgeon: Salinas Reyez MD;  Location:  [facility];  Service: General;  Laterality: N/A;  NORMAL    ENDOSCOPY N/A 9/6/2023    Procedure: ESOPHAGOGASTRODUODENOSCOPY;  Surgeon: Salinas Reyez MD;  Location:  [facility];  Service: General;  Laterality: N/A;  SLIDING HIATAL HERNIA    HAND SURGERY      VENTRAL HERNIA REPAIR N/A 11/21/2006    w/ alexei-Dr. Kirs CHENG REMOVAL      tongue       Social History     Socioeconomic History    Marital status:    Tobacco Use    Smoking status: Never     Passive exposure: Never    Smokeless tobacco: Never   Vaping Use    Vaping status: Never Used   Substance and Sexual Activity    Alcohol use: Yes     Comment: occasional    Drug use: No    Sexual activity: Yes     Comment:        Family History   Problem Relation Age of Onset    Liver disease Mother     Crohn's disease Mother     Prostate cancer Father     Glaucoma Father     Pancreatic cancer Maternal Grandmother     Lung cancer Maternal Grandfather     Heart disease Maternal Grandfather     Heart disease Paternal Grandmother     Heart disease Paternal Grandfather     Prostate cancer Paternal Grandfather     Colon cancer Other        Review of Systems   Constitutional: Positive for malaise/fatigue.   Cardiovascular:  Negative for chest pain, dyspnea on exertion, leg swelling, orthopnea, palpitations, paroxysmal nocturnal dyspnea and syncope.   Respiratory: Negative.     Hematologic/Lymphatic: Negative for bleeding problem.   Musculoskeletal:  Negative for falls.   Gastrointestinal:   "Negative for melena.   Neurological:  Negative for dizziness and light-headedness.   Psychiatric/Behavioral:  The patient is nervous/anxious.        No Known Allergies      Current Outpatient Medications:     ALPRAZolam (XANAX) 0.5 MG tablet, Take 0.5-1 tablets by mouth At Night As Needed for Anxiety., Disp: , Rfl:     apixaban (ELIQUIS) 2.5 MG tablet tablet, Take 1 tablet by mouth 2 (Two) Times a Day., Disp: 180 tablet, Rfl: 3    azelastine (ASTELIN) 0.1 % nasal spray, Administer 1 spray into the nostril(s) as directed by provider., Disp: , Rfl:     folic acid (FOLVITE) 1 MG tablet, Take 1 tablet by mouth Daily., Disp: , Rfl:     levocetirizine (XYZAL) 5 MG tablet, Take 1 tablet by mouth Every Evening., Disp: , Rfl:     multivitamin with minerals tablet tablet, Take 1 tablet by mouth Daily., Disp: , Rfl:     omeprazole (priLOSEC) 20 MG capsule, Take 1 capsule by mouth Daily., Disp: 30 capsule, Rfl: 0    rosuvastatin (CRESTOR) 10 MG tablet, Take 1 tablet by mouth Daily., Disp: , Rfl:     tadalafil (CIALIS) 5 MG tablet, Take 1 tablet by mouth As Needed., Disp: , Rfl:     testosterone (ANDROGEL) 50 MG/5GM (1%) gel gel, Place 50 mg on the skin as directed by provider., Disp: , Rfl:     tiZANidine (ZANAFLEX) 4 MG tablet, Take 1 tablet by mouth As Needed., Disp: , Rfl:     traZODone (DESYREL) 150 MG tablet, Take 1 tablet by mouth Every Night., Disp: , Rfl:     losartan (Cozaar) 25 MG tablet, Take 1 tablet by mouth Daily., Disp: 30 tablet, Rfl: 2      Objective:     Vitals:    03/26/25 1427   BP: 126/74   Pulse: 91   Weight: 113 kg (249 lb)   Height: 177.8 cm (70\")     Body mass index is 35.73 kg/m².    PHYSICAL EXAM:    Neck:      Vascular: No JVD.   Pulmonary:      Effort: Pulmonary effort is normal.      Breath sounds: Normal breath sounds.   Cardiovascular:      Normal rate. Regular rhythm.      Murmurs: There is no murmur.      No gallop.  No click. No rub.   Pulses:     Intact distal pulses.           ECG 12 " Lead    Date/Time: 3/26/2025 2:38 PM  Performed by: Snady Sellers APRN    Authorized by: Sandy Sellers APRN  Comparison: compared with previous ECG from 10/13/2023  Similar to previous ECG  Rhythm: sinus rhythm  Rate: normal  BPM: 91            Assessment:       Diagnosis Plan   1. Coronary artery disease involving native coronary artery of native heart without angina pectoris  ECG 12 Lead    Treadmill Stress Test      2. History of pulmonary embolus (PE)        3. Chronic deep vein thrombosis (DVT) of distal vein of lower extremity, unspecified laterality        4. Dyslipidemia        5. Other fatigue  CBC (No Diff)    TSH    Comprehensive Metabolic Panel      6. Essential hypertension  Basic Metabolic Panel        Orders Placed This Encounter   Procedures    CBC (No Diff)     Standing Status:   Future     Number of Occurrences:   1     Expected Date:   3/31/2025     Expiration Date:   3/26/2026     Release to patient:   Routine Release [[id number]]    TSH     Standing Status:   Future     Number of Occurrences:   1     Expected Date:   3/31/2025     Expiration Date:   3/26/2026     Release to patient:   Routine Release [[id number]]    Comprehensive Metabolic Panel     Standing Status:   Future     Number of Occurrences:   1     Expected Date:   3/31/2025     Expiration Date:   3/26/2026     Release to patient:   Routine Release [[id number]]    Basic Metabolic Panel     Standing Status:   Future     Expected Date:   4/2/2025     Expiration Date:   3/26/2026     Release to patient:   Routine Release [[id number]]    Treadmill Stress Test     Standing Status:   Future     Number of Occurrences:   1     Expected Date:   3/26/2025     Expiration Date:   3/26/2026     Reason for exam?:   Known Coronary Artery Disease     Release to patient:   Routine Release [[id number]]    ECG 12 Lead     This order was created via procedure documentation     Release to patient:   Routine Release [[id number]]           Plan:       1.  Coronary artery disease.  I really do not think his fatigue is anginal.  He has no chest pain or shortness of breath.  Given his high level of anxiety, I think it would be reasonable to walk him on the treadmill today.      2.  History of PE/DVT.  Continue Eliquis.    3.  Hyperlipidemia.  Lipid panel from February demonstrated an LDL of 44 and an HDL of 39.  Continue rosuvastatin.      4.  Fatigue.  In addition to the stress test, I recommended checking labs today.      Overall he is stable.  Further recommendations pending the results of the labs and stress test.      ADDENDUM: His stress test is normal. He had an exaggerated blood pressure response. We are going to discontinue HCTZ and start him on losartan 50 mg daily.  He will have a BMP in 1 week.  I also encouraged him to purchase a blood pressure cuff and monitor his blood pressure regularly.      As always, it has been a pleasure to participate in your patient's care.      Sincerely,         ELLE Kay

## 2025-04-03 ENCOUNTER — LAB (OUTPATIENT)
Dept: LAB | Facility: HOSPITAL | Age: 65
End: 2025-04-03
Payer: COMMERCIAL

## 2025-04-03 DIAGNOSIS — I10 ESSENTIAL HYPERTENSION: ICD-10-CM

## 2025-04-03 LAB
ANION GAP SERPL CALCULATED.3IONS-SCNC: 7.9 MMOL/L (ref 5–15)
BUN SERPL-MCNC: 21 MG/DL (ref 8–23)
BUN/CREAT SERPL: 15 (ref 7–25)
CALCIUM SPEC-SCNC: 9.8 MG/DL (ref 8.6–10.5)
CHLORIDE SERPL-SCNC: 106 MMOL/L (ref 98–107)
CO2 SERPL-SCNC: 25.1 MMOL/L (ref 22–29)
CREAT SERPL-MCNC: 1.4 MG/DL (ref 0.76–1.27)
EGFRCR SERPLBLD CKD-EPI 2021: 56.1 ML/MIN/1.73
GLUCOSE SERPL-MCNC: 105 MG/DL (ref 65–99)
POTASSIUM SERPL-SCNC: 4.6 MMOL/L (ref 3.5–5.2)
SODIUM SERPL-SCNC: 139 MMOL/L (ref 136–145)

## 2025-04-03 PROCEDURE — 80048 BASIC METABOLIC PNL TOTAL CA: CPT

## 2025-04-03 PROCEDURE — 36415 COLL VENOUS BLD VENIPUNCTURE: CPT

## 2025-04-22 RX ORDER — LOSARTAN POTASSIUM 25 MG/1
25 TABLET ORAL DAILY
Qty: 30 TABLET | Refills: 2 | Status: SHIPPED | OUTPATIENT
Start: 2025-04-22

## 2025-04-22 NOTE — TELEPHONE ENCOUNTER
Refill Protocol Passed Requesting Losartan 25 mg    LOV 03/26/2025 w/ RS    FU NOT ON FILE     Last Labs-    BMP 04/03/2025    TSH 03/26/2025    CBC 03/26/2025    LIPID 02/04/2025    Please review and sign.

## 2025-07-14 RX ORDER — LOSARTAN POTASSIUM 25 MG/1
25 TABLET ORAL DAILY
Qty: 90 TABLET | Refills: 1 | Status: SHIPPED | OUTPATIENT
Start: 2025-07-14

## 2025-07-14 RX ORDER — LOSARTAN POTASSIUM 25 MG/1
25 TABLET ORAL DAILY
Qty: 90 TABLET | Refills: 1 | Status: SHIPPED | OUTPATIENT
Start: 2025-07-14 | End: 2025-07-14 | Stop reason: SDUPTHER

## (undated) DEVICE — KT ORCA ORCAPOD DISP STRL

## (undated) DEVICE — ADAPT CLN BIOGUARD AIR/H2O DISP

## (undated) DEVICE — TUBING, SUCTION, 1/4" X 10', STRAIGHT: Brand: MEDLINE

## (undated) DEVICE — BLCK/BITE BLOX W/DENTL/RIM W/STRAP 54F

## (undated) DEVICE — LN SMPL CO2 SHTRM SD STREAM W/M LUER

## (undated) DEVICE — MSK PROC CURAPLEX O2 2/ADAPT 7FT

## (undated) DEVICE — SENSR O2 OXIMAX FNGR A/ 18IN NONSTR